# Patient Record
Sex: FEMALE | Race: WHITE | Employment: STUDENT | URBAN - METROPOLITAN AREA
[De-identification: names, ages, dates, MRNs, and addresses within clinical notes are randomized per-mention and may not be internally consistent; named-entity substitution may affect disease eponyms.]

---
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
## 2018-05-14 ENCOUNTER — OFFICE VISIT (OUTPATIENT)
Dept: OTOLARYNGOLOGY | Facility: CLINIC | Age: 18
End: 2018-05-14

## 2018-05-14 ENCOUNTER — LAB ENCOUNTER (OUTPATIENT)
Dept: LAB | Facility: HOSPITAL | Age: 18
End: 2018-05-14
Attending: Other
Payer: COMMERCIAL

## 2018-05-14 VITALS
SYSTOLIC BLOOD PRESSURE: 151 MMHG | HEART RATE: 103 BPM | BODY MASS INDEX: 41.42 KG/M2 | TEMPERATURE: 99 F | HEIGHT: 69 IN | WEIGHT: 279.63 LBS | DIASTOLIC BLOOD PRESSURE: 78 MMHG

## 2018-05-14 DIAGNOSIS — R53.83 FATIGUE: Primary | ICD-10-CM

## 2018-05-14 DIAGNOSIS — E55.9 VITAMIN D DEFICIENCY: ICD-10-CM

## 2018-05-14 DIAGNOSIS — J30.9 ALLERGIC RHINITIS, UNSPECIFIED SEASONALITY, UNSPECIFIED TRIGGER: Primary | ICD-10-CM

## 2018-05-14 DIAGNOSIS — J30.9 ALLERGIC RHINITIS, UNSPECIFIED SEASONALITY, UNSPECIFIED TRIGGER: ICD-10-CM

## 2018-05-14 PROCEDURE — 82785 ASSAY OF IGE: CPT

## 2018-05-14 PROCEDURE — 86003 ALLG SPEC IGE CRUDE XTRC EA: CPT

## 2018-05-14 PROCEDURE — 84439 ASSAY OF FREE THYROXINE: CPT

## 2018-05-14 PROCEDURE — 82607 VITAMIN B-12: CPT

## 2018-05-14 PROCEDURE — 36415 COLL VENOUS BLD VENIPUNCTURE: CPT

## 2018-05-14 PROCEDURE — 82306 VITAMIN D 25 HYDROXY: CPT

## 2018-05-14 PROCEDURE — 84443 ASSAY THYROID STIM HORMONE: CPT

## 2018-05-14 PROCEDURE — 99212 OFFICE O/P EST SF 10 MIN: CPT | Performed by: OTOLARYNGOLOGY

## 2018-05-14 PROCEDURE — 99244 OFF/OP CNSLTJ NEW/EST MOD 40: CPT | Performed by: OTOLARYNGOLOGY

## 2018-05-14 PROCEDURE — 80053 COMPREHEN METABOLIC PANEL: CPT

## 2018-05-14 PROCEDURE — 85025 COMPLETE CBC W/AUTO DIFF WBC: CPT

## 2018-05-14 PROCEDURE — 82728 ASSAY OF FERRITIN: CPT

## 2018-05-14 NOTE — PROGRESS NOTES
Emiliano Sosa is a 21year old female. Patient presents with:  Snoring: Pt states around feb she has started to notice snoring at night, and states she has been having stuffy nose. Ear Problem: Pt states since feb on and off.   States about 2 weeks Grandmother    • Cancer Paternal Grandmother      lung   • High Cholesterol Other      pggm, pggf, p uncle   • Cancer Maternal Grandfather      stomach colom     Past Medical History:   Diagnosis Date   • Depression    • Fracture of radius and ulna 07/01/0 Pupil - Right: Normal, Left: Normal. Fundus - Right: Normal, Left: Normal. EOMI   Neurological Normal Memory - Normal. Cranial nerves - Cranial nerves II through XII grossly intact.  Gait is normal   Head/Face Normal Facial features - Normal. Eyebrows - Nor recommend we work her up for allergies and continue the fluticasone that she was prescribed see if this resolves. - ADULT FOOD ALLERGY PROF; Future  - [facility]; Future  - [facility];  Future            Brandon Hernandez MD

## 2020-05-01 PROCEDURE — 88304 TISSUE EXAM BY PATHOLOGIST: CPT | Performed by: SURGERY

## 2020-12-30 PROCEDURE — 88305 TISSUE EXAM BY PATHOLOGIST: CPT | Performed by: INTERNAL MEDICINE
